# Patient Record
Sex: FEMALE | Race: WHITE | NOT HISPANIC OR LATINO | Employment: FULL TIME | URBAN - METROPOLITAN AREA
[De-identification: names, ages, dates, MRNs, and addresses within clinical notes are randomized per-mention and may not be internally consistent; named-entity substitution may affect disease eponyms.]

---

## 2022-04-27 ENCOUNTER — OFFICE VISIT (OUTPATIENT)
Dept: FAMILY MEDICINE CLINIC | Facility: CLINIC | Age: 47
End: 2022-04-27

## 2022-04-27 VITALS
TEMPERATURE: 97.7 F | HEART RATE: 93 BPM | WEIGHT: 173 LBS | HEIGHT: 68 IN | OXYGEN SATURATION: 98 % | SYSTOLIC BLOOD PRESSURE: 98 MMHG | BODY MASS INDEX: 26.22 KG/M2 | DIASTOLIC BLOOD PRESSURE: 72 MMHG

## 2022-04-27 DIAGNOSIS — R10.84 GENERALIZED ABDOMINAL PAIN: ICD-10-CM

## 2022-04-27 DIAGNOSIS — Z00.00 ANNUAL PHYSICAL EXAM: Primary | ICD-10-CM

## 2022-04-27 PROCEDURE — 99386 PREV VISIT NEW AGE 40-64: CPT | Performed by: NURSE PRACTITIONER

## 2022-04-27 PROCEDURE — 99203 OFFICE O/P NEW LOW 30 MIN: CPT | Performed by: NURSE PRACTITIONER

## 2022-04-27 PROCEDURE — 74018 RADEX ABDOMEN 1 VIEW: CPT | Performed by: NURSE PRACTITIONER

## 2022-04-27 RX ORDER — PROMETHAZINE HYDROCHLORIDE 25 MG/1
25 TABLET ORAL EVERY 6 HOURS PRN
Qty: 12 TABLET | Refills: 0 | Status: SHIPPED | OUTPATIENT
Start: 2022-04-27 | End: 2022-07-26

## 2022-04-27 NOTE — PROGRESS NOTES
"Chief Complaint  Vomiting and Diarrhea    Subjective          Ewa Pinto presents to [facility]  Patient presents to the office for an annual physical exam visit. Patient reports she just got home after a women's retreat in [place]  When she got home on Monday she was doing ok and then started to develop stomach complaints accompanied by N/V/D. Patient reported she was having abdomen cramp, burning in her stomach, and back cramps. She has had indiegestion. She tried TUMS without relief. She is not tolerating food and has had water and sprite. She pt has had N/V/D. Patient denies dysuria, frequency. Patient blood pressure is 98/72  .  Patient denies chest pain / shortness of air. Patient is without acute pain or distress at time of dictation.         Objective   Vital Signs:   BP 98/72 (BP Location: Left arm, Patient Position: Sitting, Cuff Size: Large Adult)   Pulse 93   Temp 97.7 °F (36.5 °C) (Infrared)   Ht 172.7 cm (68\")   Wt 78.5 kg (173 lb)   SpO2 98%   BMI 26.30 kg/m²     Physical Exam  Constitutional:       General: She is not in acute distress.     Appearance: Normal appearance. She is not ill-appearing, toxic-appearing or diaphoretic.   HENT:      Head: Normocephalic.      Right Ear: Tympanic membrane and external ear normal. There is no impacted cerumen.      Left Ear: Tympanic membrane and external ear normal. There is no impacted cerumen.      Nose: Nose normal. No congestion or rhinorrhea.      Mouth/Throat:      Mouth: Mucous membranes are moist.      Pharynx: Oropharynx is clear. No oropharyngeal exudate or posterior oropharyngeal erythema.   Eyes:      General:         Right eye: No discharge.         Left eye: No discharge.      Extraocular Movements: Extraocular movements intact.      Conjunctiva/sclera: Conjunctivae normal.      Pupils: Pupils are equal, round, and reactive to light.   Neck:      Vascular: No carotid bruit.   Cardiovascular:      Rate and Rhythm: " Normal rate and regular rhythm.      Pulses: Normal pulses.      Heart sounds: Normal heart sounds. No murmur heard.    No friction rub. No gallop.   Pulmonary:      Effort: Pulmonary effort is normal. No respiratory distress.      Breath sounds: Normal breath sounds. No wheezing, rhonchi or rales.   Chest:      Chest wall: No tenderness.   Abdominal:      General: Abdomen is flat. Bowel sounds are normal. There is no distension.      Palpations: Abdomen is soft. There is no mass.      Tenderness: There is generalized abdominal tenderness and tenderness in the epigastric area. There is no guarding or rebound.      Hernia: No hernia is present.   Musculoskeletal:         General: No swelling or tenderness. Normal range of motion.      Cervical back: Normal range of motion and neck supple. No tenderness.   Skin:     General: Skin is warm and dry.      Coloration: Skin is not jaundiced or pale.      Findings: No erythema or rash.   Neurological:      Mental Status: She is alert and oriented to person, place, and time.      Sensory: No sensory deficit.      Motor: No weakness.      Gait: Gait normal.   Psychiatric:         Mood and Affect: Mood normal.         Behavior: Behavior normal.         Thought Content: Thought content normal.         Judgment: Judgment normal.        Result Review :                 Assessment and Plan    Diagnoses and all orders for this visit:    1. Annual physical exam (Primary)  Assessment & Plan:  Counseling was provided on nutrition, physical activity, development, and injury prevention, dental health, and safe sex practices patient verbalizes understanding no additional questions were asked.        2. Generalized abdominal pain  Assessment & Plan:  No clinical signs of appendicitis/pancreatitis, No guarding, no rigidity.  Report to ER if signs/ symptoms worsen.   Check KUB  No obvious mass/obstruction   Radiology to read final report    BRAT diet       Orders:  -     XR Abdomen KUB (In  Office)    Other orders  -     promethazine (PHENERGAN) 25 MG tablet; Take 1 tablet by mouth Every 6 (Six) Hours As Needed for Nausea or Vomiting.  Dispense: 12 tablet; Refill: 0      Pt to rto for fasting labs.        Follow Up   No follow-ups on file.  Patient was given instructions and counseling regarding her condition or for health maintenance advice. Please see specific information pulled into the AVS if appropriate.

## 2022-04-28 DIAGNOSIS — R53.83 FATIGUE, UNSPECIFIED TYPE: ICD-10-CM

## 2022-04-28 DIAGNOSIS — Z82.49 FAMILY HISTORY OF EARLY CAD: ICD-10-CM

## 2022-04-28 DIAGNOSIS — J30.2 SEASONAL ALLERGIES: Primary | ICD-10-CM

## 2022-04-28 PROBLEM — Z00.00 ANNUAL PHYSICAL EXAM: Status: ACTIVE | Noted: 2022-04-28

## 2022-04-28 PROBLEM — R10.84 GENERALIZED ABDOMINAL PAIN: Status: ACTIVE | Noted: 2022-04-28

## 2022-04-29 NOTE — ASSESSMENT & PLAN NOTE
No clinical signs of appendicitis/pancreatitis, No guarding, no rigidity.  Report to ER if signs/ symptoms worsen.   Check KUB  No obvious mass/obstruction   Radiology to read final report    BRAT diet

## 2022-07-26 ENCOUNTER — OFFICE VISIT (OUTPATIENT)
Dept: FAMILY MEDICINE CLINIC | Facility: CLINIC | Age: 47
End: 2022-07-26

## 2022-07-26 VITALS
WEIGHT: 179 LBS | DIASTOLIC BLOOD PRESSURE: 64 MMHG | SYSTOLIC BLOOD PRESSURE: 90 MMHG | HEIGHT: 68 IN | BODY MASS INDEX: 27.13 KG/M2 | HEART RATE: 72 BPM | TEMPERATURE: 97.7 F | OXYGEN SATURATION: 98 %

## 2022-07-26 DIAGNOSIS — R53.83 FATIGUE, UNSPECIFIED TYPE: ICD-10-CM

## 2022-07-26 DIAGNOSIS — Z82.49 FAMILY HISTORY OF EARLY CAD: ICD-10-CM

## 2022-07-26 DIAGNOSIS — Z00.00 ANNUAL PHYSICAL EXAM: Primary | ICD-10-CM

## 2022-07-26 DIAGNOSIS — R05.8 PRODUCTIVE COUGH: ICD-10-CM

## 2022-07-26 DIAGNOSIS — J30.2 SEASONAL ALLERGIES: ICD-10-CM

## 2022-07-26 PROCEDURE — 84443 ASSAY THYROID STIM HORMONE: CPT | Performed by: NURSE PRACTITIONER

## 2022-07-26 PROCEDURE — 80061 LIPID PANEL: CPT | Performed by: NURSE PRACTITIONER

## 2022-07-26 PROCEDURE — 82306 VITAMIN D 25 HYDROXY: CPT | Performed by: NURSE PRACTITIONER

## 2022-07-26 PROCEDURE — 99396 PREV VISIT EST AGE 40-64: CPT | Performed by: NURSE PRACTITIONER

## 2022-07-26 PROCEDURE — 80053 COMPREHEN METABOLIC PANEL: CPT | Performed by: NURSE PRACTITIONER

## 2022-07-26 PROCEDURE — 71046 X-RAY EXAM CHEST 2 VIEWS: CPT | Performed by: NURSE PRACTITIONER

## 2022-07-26 PROCEDURE — 99213 OFFICE O/P EST LOW 20 MIN: CPT | Performed by: NURSE PRACTITIONER

## 2022-07-26 PROCEDURE — 85025 COMPLETE CBC W/AUTO DIFF WBC: CPT | Performed by: NURSE PRACTITIONER

## 2022-08-01 PROBLEM — R53.83 FATIGUE: Status: ACTIVE | Noted: 2022-08-01

## 2022-08-01 PROBLEM — Z82.49 FAMILY HISTORY OF EARLY CAD: Status: ACTIVE | Noted: 2022-08-01

## 2022-08-01 PROBLEM — R05.8 PRODUCTIVE COUGH: Status: ACTIVE | Noted: 2022-08-01

## 2022-08-01 PROBLEM — J30.2 SEASONAL ALLERGIES: Status: ACTIVE | Noted: 2022-08-01

## 2022-08-02 ENCOUNTER — TELEPHONE (OUTPATIENT)
Dept: FAMILY MEDICINE CLINIC | Facility: CLINIC | Age: 47
End: 2022-08-02

## 2022-08-02 NOTE — TELEPHONE ENCOUNTER
low vitamin D. recommend start taking a multivitamin and vitamin D replacement.  All other labs are normal chest x-ray is pending.  Pt informed of results

## 2022-08-02 NOTE — TELEPHONE ENCOUNTER
Caller: Ewa Pinto    Relationship: Self    Best call back number:[phone]    What is the best time to reach you:ANY     Who are you requesting to speak with (clinical staff, provider,  specific staff member): CLINICAL STAFF     What was the call regarding: WOULD LIKE A CALL BACK TO DISCUSS LAB RESULTS     Do you require a callback: YES

## 2022-08-15 LAB
25(OH)D3 SERPL-MCNC: 28.3 NG/ML (ref 30–100)
ALBUMIN SERPL-MCNC: 4.2 G/DL (ref 3.5–5.2)
ALBUMIN/GLOB SERPL: 1.7 G/DL
ALP SERPL-CCNC: 66 U/L (ref 39–117)
ALT SERPL W P-5'-P-CCNC: 15 U/L (ref 1–33)
ANION GAP SERPL CALCULATED.3IONS-SCNC: 9 MMOL/L (ref 5–15)
AST SERPL-CCNC: 15 U/L (ref 1–32)
BILIRUB SERPL-MCNC: 0.2 MG/DL (ref 0–1.2)
BUN SERPL-MCNC: 19 MG/DL (ref 6–20)
BUN/CREAT SERPL: 30.2 (ref 7–25)
CALCIUM SPEC-SCNC: 9.4 MG/DL (ref 8.6–10.5)
CHLORIDE SERPL-SCNC: 102 MMOL/L (ref 98–107)
CHOLEST SERPL-MCNC: 190 MG/DL (ref 0–200)
CO2 SERPL-SCNC: 27 MMOL/L (ref 22–29)
CREAT SERPL-MCNC: 0.63 MG/DL (ref 0.57–1)
EGFRCR SERPLBLD CKD-EPI 2021: 110.3 ML/MIN/1.73
ERYTHROCYTE [DISTWIDTH] IN BLOOD BY AUTOMATED COUNT: 12.8 % (ref 12.3–15.4)
GLOBULIN UR ELPH-MCNC: 2.5 GM/DL
GLUCOSE SERPL-MCNC: 87 MG/DL (ref 65–99)
HCT VFR BLD AUTO: 43.2 % (ref 34–46.6)
HDLC SERPL-MCNC: 51 MG/DL (ref 40–60)
HGB BLD-MCNC: 14 G/DL (ref 12–15.9)
LDLC SERPL CALC-MCNC: 117 MG/DL (ref 0–100)
LDLC/HDLC SERPL: 2.24 {RATIO}
LYMPHOCYTES # BLD AUTO: 2.8 10*3/MM3 (ref 0.7–3.1)
LYMPHOCYTES NFR BLD AUTO: 30.6 % (ref 19.6–45.3)
MCH RBC QN AUTO: 30.1 PG (ref 26.6–33)
MCHC RBC AUTO-ENTMCNC: 32.4 G/DL (ref 31.5–35.7)
MCV RBC AUTO: 92.7 FL (ref 79–97)
MONOCYTES # BLD AUTO: 0.5 10*3/MM3 (ref 0.1–0.9)
MONOCYTES NFR BLD AUTO: 5.3 % (ref 5–12)
NEUTROPHILS NFR BLD AUTO: 5.9 10*3/MM3 (ref 1.7–7)
NEUTROPHILS NFR BLD AUTO: 64.1 % (ref 42.7–76)
PLATELET # BLD AUTO: 245 10*3/MM3 (ref 140–450)
PMV BLD AUTO: 6.8 FL (ref 6–12)
POTASSIUM SERPL-SCNC: 4.5 MMOL/L (ref 3.5–5.2)
PROT SERPL-MCNC: 6.7 G/DL (ref 6–8.5)
RBC # BLD AUTO: 4.66 10*6/MM3 (ref 3.77–5.28)
SODIUM SERPL-SCNC: 138 MMOL/L (ref 136–145)
TRIGL SERPL-MCNC: 125 MG/DL (ref 0–150)
TSH SERPL DL<=0.05 MIU/L-ACNC: 1.16 UIU/ML (ref 0.27–4.2)
VLDLC SERPL-MCNC: 22 MG/DL (ref 5–40)
WBC NRBC COR # BLD: 9.2 10*3/MM3 (ref 3.4–10.8)

## 2023-04-26 ENCOUNTER — TELEPHONE (OUTPATIENT)
Dept: FAMILY MEDICINE CLINIC | Facility: CLINIC | Age: 48
End: 2023-04-26
Payer: COMMERCIAL

## 2023-04-26 DIAGNOSIS — Z12.31 SCREENING MAMMOGRAM FOR BREAST CANCER: Primary | ICD-10-CM

## 2023-04-26 NOTE — TELEPHONE ENCOUNTER
Caller: Ewa Pinto    Relationship: Self    Best call back number: [phone]    What is the best time to reach you: ANY    Who are you requesting to speak with (clinical staff, provider,  specific staff member): NONE    Do you know the name of the person who called: NONE    What was the call regarding: REFERRAL FOR A MAMMOGRAM    Do you require a callback: YES

## 2023-05-09 ENCOUNTER — HOSPITAL ENCOUNTER (OUTPATIENT)
Dept: MAMMOGRAPHY | Facility: HOSPITAL | Age: 48
Discharge: HOME | End: 2023-05-09
Admitting: NURSE PRACTITIONER
Payer: COMMERCIAL

## 2023-05-09 DIAGNOSIS — Z12.31 SCREENING MAMMOGRAM FOR BREAST CANCER: ICD-10-CM

## 2023-05-09 PROCEDURE — 77063 BREAST TOMOSYNTHESIS BI: CPT

## 2023-05-09 PROCEDURE — 77067 SCR MAMMO BI INCL CAD: CPT

## 2023-08-01 ENCOUNTER — OFFICE VISIT (OUTPATIENT)
Dept: FAMILY MEDICINE CLINIC | Facility: CLINIC | Age: 48
End: 2023-08-01
Payer: COMMERCIAL

## 2023-08-01 VITALS
TEMPERATURE: 97.8 F | SYSTOLIC BLOOD PRESSURE: 102 MMHG | OXYGEN SATURATION: 98 % | BODY MASS INDEX: 28.58 KG/M2 | WEIGHT: 188.6 LBS | HEART RATE: 72 BPM | HEIGHT: 68 IN | DIASTOLIC BLOOD PRESSURE: 74 MMHG

## 2023-08-01 DIAGNOSIS — J30.2 SEASONAL ALLERGIES: ICD-10-CM

## 2023-08-01 DIAGNOSIS — Z11.59 ENCOUNTER FOR HEPATITIS C SCREENING TEST FOR LOW RISK PATIENT: ICD-10-CM

## 2023-08-01 DIAGNOSIS — Z12.11 SCREEN FOR COLON CANCER: ICD-10-CM

## 2023-08-01 DIAGNOSIS — Z12.4 CERVICAL CANCER SCREENING: ICD-10-CM

## 2023-08-01 DIAGNOSIS — Z00.00 ANNUAL PHYSICAL EXAM: Primary | ICD-10-CM

## 2023-08-01 DIAGNOSIS — N95.1 MENOPAUSAL STATE: ICD-10-CM

## 2023-08-01 PROCEDURE — 99396 PREV VISIT EST AGE 40-64: CPT | Performed by: NURSE PRACTITIONER

## 2023-08-02 LAB
ALBUMIN SERPL-MCNC: 4.2 G/DL (ref 3.5–5.2)
ALBUMIN/GLOB SERPL: 1.7 G/DL
ALP SERPL-CCNC: 67 U/L (ref 39–117)
ALT SERPL-CCNC: 16 U/L (ref 1–33)
AST SERPL-CCNC: 12 U/L (ref 1–32)
BASOPHILS # BLD AUTO: 0.06 10*3/MM3 (ref 0–0.2)
BASOPHILS NFR BLD AUTO: 1.2 % (ref 0–1.5)
BILIRUB SERPL-MCNC: 0.3 MG/DL (ref 0–1.2)
BUN SERPL-MCNC: 15 MG/DL (ref 6–20)
BUN/CREAT SERPL: 23.4 (ref 7–25)
CALCIUM SERPL-MCNC: 9.8 MG/DL (ref 8.6–10.5)
CHLORIDE SERPL-SCNC: 104 MMOL/L (ref 98–107)
CHOLEST SERPL-MCNC: 184 MG/DL (ref 0–200)
CO2 SERPL-SCNC: 25.1 MMOL/L (ref 22–29)
CREAT SERPL-MCNC: 0.64 MG/DL (ref 0.57–1)
EGFRCR SERPLBLD CKD-EPI 2021: 109.2 ML/MIN/1.73
EOSINOPHIL # BLD AUTO: 0.22 10*3/MM3 (ref 0–0.4)
EOSINOPHIL NFR BLD AUTO: 4.4 % (ref 0.3–6.2)
ERYTHROCYTE [DISTWIDTH] IN BLOOD BY AUTOMATED COUNT: 12.6 % (ref 12.3–15.4)
ESTROGEN SERPL-MCNC: 70 PG/ML
FSH SERPL-ACNC: 135 MIU/ML
GLOBULIN SER CALC-MCNC: 2.5 GM/DL
GLUCOSE SERPL-MCNC: 90 MG/DL (ref 65–99)
HCT VFR BLD AUTO: 42.1 % (ref 34–46.6)
HCV IGG SERPL QL IA: NON REACTIVE
HDLC SERPL-MCNC: 61 MG/DL (ref 40–60)
HGB BLD-MCNC: 14 G/DL (ref 12–15.9)
IMM GRANULOCYTES # BLD AUTO: 0.01 10*3/MM3 (ref 0–0.05)
IMM GRANULOCYTES NFR BLD AUTO: 0.2 % (ref 0–0.5)
LDLC SERPL CALC-MCNC: 113 MG/DL (ref 0–100)
LH SERPL-ACNC: 68.3 MIU/ML
LYMPHOCYTES # BLD AUTO: 1.8 10*3/MM3 (ref 0.7–3.1)
LYMPHOCYTES NFR BLD AUTO: 36.3 % (ref 19.6–45.3)
MCH RBC QN AUTO: 30.7 PG (ref 26.6–33)
MCHC RBC AUTO-ENTMCNC: 33.3 G/DL (ref 31.5–35.7)
MCV RBC AUTO: 92.3 FL (ref 79–97)
MONOCYTES # BLD AUTO: 0.42 10*3/MM3 (ref 0.1–0.9)
MONOCYTES NFR BLD AUTO: 8.5 % (ref 5–12)
NEUTROPHILS # BLD AUTO: 2.45 10*3/MM3 (ref 1.7–7)
NEUTROPHILS NFR BLD AUTO: 49.4 % (ref 42.7–76)
NRBC BLD AUTO-RTO: 0 /100 WBC (ref 0–0.2)
PLATELET # BLD AUTO: 254 10*3/MM3 (ref 140–450)
POTASSIUM SERPL-SCNC: 4.6 MMOL/L (ref 3.5–5.2)
PROGEST SERPL-MCNC: 0.1 NG/ML
PROT SERPL-MCNC: 6.7 G/DL (ref 6–8.5)
RBC # BLD AUTO: 4.56 10*6/MM3 (ref 3.77–5.28)
SODIUM SERPL-SCNC: 141 MMOL/L (ref 136–145)
TRIGL SERPL-MCNC: 54 MG/DL (ref 0–150)
TSH SERPL DL<=0.005 MIU/L-ACNC: 0.77 UIU/ML (ref 0.27–4.2)
VLDLC SERPL CALC-MCNC: 10 MG/DL (ref 5–40)
WBC # BLD AUTO: 4.96 10*3/MM3 (ref 3.4–10.8)

## 2023-08-07 ENCOUNTER — TELEPHONE (OUTPATIENT)
Dept: FAMILY MEDICINE CLINIC | Facility: CLINIC | Age: 48
End: 2023-08-07
Payer: COMMERCIAL

## 2024-04-24 ENCOUNTER — TRANSCRIBE ORDERS (OUTPATIENT)
Dept: ADMINISTRATIVE | Facility: HOSPITAL | Age: 49
End: 2024-04-24
Payer: COMMERCIAL

## 2024-04-24 DIAGNOSIS — Z12.31 SCREENING MAMMOGRAM FOR BREAST CANCER: Primary | ICD-10-CM

## 2024-05-10 ENCOUNTER — HOSPITAL ENCOUNTER (OUTPATIENT)
Dept: MAMMOGRAPHY | Facility: HOSPITAL | Age: 49
Discharge: HOME | End: 2024-05-10
Admitting: NURSE PRACTITIONER
Payer: COMMERCIAL

## 2024-05-10 DIAGNOSIS — Z12.31 SCREENING MAMMOGRAM FOR BREAST CANCER: ICD-10-CM

## 2024-05-10 PROCEDURE — 77067 SCR MAMMO BI INCL CAD: CPT

## 2024-05-10 PROCEDURE — 77063 BREAST TOMOSYNTHESIS BI: CPT

## 2024-05-21 DIAGNOSIS — Z80.3 FAMILY HISTORY OF BREAST CANCER IN FIRST DEGREE RELATIVE: Primary | ICD-10-CM

## 2024-08-07 ENCOUNTER — OFFICE VISIT (OUTPATIENT)
Dept: MAMMOGRAPHY | Facility: CLINIC | Age: 49
End: 2024-08-07
Payer: COMMERCIAL

## 2024-08-07 VITALS
HEIGHT: 68 IN | BODY MASS INDEX: 26.07 KG/M2 | OXYGEN SATURATION: 97 % | SYSTOLIC BLOOD PRESSURE: 110 MMHG | HEART RATE: 94 BPM | DIASTOLIC BLOOD PRESSURE: 84 MMHG | WEIGHT: 172 LBS

## 2024-08-07 DIAGNOSIS — Z91.89 AT HIGH RISK FOR BREAST CANCER: Primary | ICD-10-CM

## 2024-08-07 PROCEDURE — 99204 OFFICE O/P NEW MOD 45 MIN: CPT | Performed by: SURGERY

## 2024-08-07 NOTE — LETTER
August 7, 2024     EBONIE Castro  [address] [facility] [address]  [place] [address]    Patient: Ewa Pinto   YOB: 1975   Date of Visit: 8/7/2024     Dear EBONIE Castro:       Thank you for referring Ewa Pinto to me for evaluation. Below are the relevant portions of my assessment and plan of care.    If you have questions, please do not hesitate to call me. I look forward to following Ewa along with you.         Sincerely,        Roland Damon MD        CC: No Recipients    Roland Damon MD  08/07/24 1519  Sign when Signing Visit  Chief Complaint: Ewa Pinto is a 49 y.o.. female here today for Consult        History of Present Illness:  Patient presents with management of breast cancer risk.   She is a nice 49-year-old white female noted to be at high risk for developing breast cancer.  Her sister developed breast cancer at the age of 48 which was triple negative.  She also has a maternal aunt with a triple negative breast cancer at 52 and a maternal grandmother with breast cancer at 48.  There is a possibility that her sister did test positive for a genetic mutation but we just do not have that information yet.  The patient will track that down for us.    The patient's imaging is in good order.  She had mammograms and May 2024.  I have personally reviewed those imaging studies along with the reports.  She does have implants in place on both sides which appear to be intact.  The breast tissue is heterogeneously dense.  There were no suspicious calcifications, masses, or areas of architectural distortion.    The patient also describes a 2-month history of sharp bilateral breast pain.  It seems to be again more in the axillary regions and goes down into the lateral part of the breast and she also has associated nipple burning on both sides.    Review of Systems:  Review of Systems   Skin:         The patient denies any noticeable changes to the skin of the breast.   All  other systems reviewed and are negative.     I have reviewed the ROS as documented by the MA/LPN/RN Roland Damon MD      Past Medical and Surgical History:  Breast Biopsy History:  Patient has not had a breast biopsy in the past.  Breast Cancer HIstory:  Patient does not have a past medical history of breast cancer.  Breast Operations, and year:  0  Social History     Tobacco Use   Smoking Status Former   • Current packs/day: 0.50   • Types: Cigarettes   Smokeless Tobacco Never     Obstetric History:  Patient is postmenopausal, entered menopause naturally at age: 45   Number of pregnancies:6  Number of live births: 5  Number of abortions or miscarriages: 1  Age of delivery of first child: 16  Patient breast fed, for the following lenth of time: 4 years  Length of time taking birth control pills:unknown  Patient has never taken hormone replacement    Past Surgical History:   Procedure Laterality Date   • AUGMENTATION MAMMAPLASTY     • BREAST AUGMENTATION     • LAPAROSCOPIC TUBAL LIGATION         History reviewed. No pertinent past medical history.    Prior Hospitalizations, other than for surgery or childbirth, and year:  None    Social History:  Patient is .  Patient has 3 daughters. And 2 Sons    Family History:  Family History   Problem Relation Age of Onset   • Breast cancer Sister 48        triple negative   • Breast cancer Maternal Grandmother 48   • Breast cancer Maternal Aunt 52       Vital Signs:  Vitals:    08/07/24 1321   BP: 110/84   Pulse: 94   SpO2: 97%       Medications:    Current Outpatient Prescriptions:   No current outpatient medications on file.    Physical Examination:  General Appearance:   Patient is in no distress.  She is well kept and has a BMI of 26.2.  Psychiatric:  Patient with appropriate mood and affect. Alert and oriented to self, time, and place.    Breast, RIGHT:  large sized, symmetric with the contralateral side.  Breast skin is without erythema, edema, rashes.   There are no visible abnormalities upon inspection during the arm-raising maneuver or with hands on hips in the sitting position. There is no nipple retraction, discharge or nipple/areolar skin changes.There are no masses palpable in the sitting or supine positions.  The implant appears to be intact.    Breast, LEFT:  large sized, symmetric with the contralateral side.  Breast skin is without erythema, edema, rashes.  There are no visible abnormalities upon inspection during the arm-raising maneuver or with hands on hips in the sitting position. There is no nipple retraction, discharge or nipple/areolar skin changes.There are no masses palpable in the sitting or supine positions.  The implant appears to be intact.  I can feel some creases in the capsule of the implant in the lower outer quadrant of the breast which is a little different than the other side.    Lymphatic:  There is no axillary, cervical, infraclavicular, or supraclavicular adenopathy bilaterally.    Gastrointestinal:  Abdomen is soft, nondistended, and nontender.  There was no obvious hepatosplenomegaly or abdominal mass.  There are no scars from previous surgery.    Musculoskeletal:  Good strength in all 4 extremities.   There is good range of motion in both shoulders.    Skin:  She does have multiple tattoos on her body.    Assessment:  1. At high risk for breast cancer    The patient is aware that high risk breast cancer patients are typically identified because of a strong family history for breast cancer, a genetic mutation, LCIS, atypical hyperplasia, or history of radiation to chest wall under the age of 30.  It sounds as if her sister may have a genetic mutation but the patient is unsure and her sister does live in [place].  We attempted to call her while the patient was in the office but she will keep tracking her down and let me know about the possibility of that mutation.  If there is indeed a positive mutation the patient will  definitely need to be genetically tested.  She plans to call me with those results.  Our risk assessment models of estimate her lifetime risk around 23%.  Her 5-year risk is estimated at 1.9%.  Based on these findings the patient could consider adding supplemental MRI imaging to her yearly 3D mammography.  She would like to pursue that.  It would also be helpful for her to have a clinical breast examination every 6 months and I would like to alternate those visits with Dr. Rizo    2.  Bilateral breast pain-We discussed that the differential diagnosis of breast pain includes, but is not limited to: fibrocystic condition, macrocysts, fibroadenomas, breast cancer,  trauma to the breast or chest wall, inflammation or  other musculoskeletal disturbances of the chest wall.  There are no concerning findings on her examination today or on her most recent imaging for a focal cause of her pain- ie a mass, inflammation, or trauma. Both her exam and imaging are in good order. The most likely etiology of her breast pain is fibrocystic condition, meaning a hormonal responsiveness of the breast tissue.  We discussed that this pain can be aggravated by many things, including stress, caffeine, and fluctuations in hormone levels.  We discussed the most common interventions to alleviate her symptoms, including wearing a supportive bra, reducing caffeine intake, oral vitamin E 400 units qday or BID, or evening primrose oil 2000-3000mg orally daily.  She may consider taking some vitamin E.  The patient will also be getting an MRI in the very near future which should help us rule out anything concerning.        Plan:  1.  Orders were placed for an MRI to be performed in October 2024.  I will call her with those results.  2.  I would like to see her back in the office in 1 year provide her imaging looks okay and she is not feeling anything.  3.  The patient will consider improving her breast support and perhaps taking some vitamin  E.      CPT coding:    Next Appointment:  No follow-ups on file.            EMR Dragon/transcription disclaimer:    Much of this encounter note is an electronic transcription/translocation of spoken language to printed text.  The electronic translation of spoken language may permit erroneous, or at times, nonsensical words or phrases to be inadvertently transcribed.  Although I have reviewed the note from such areas, some may still exist.

## 2024-08-07 NOTE — PROGRESS NOTES
Chief Complaint: Ewa Pinto is a 49 y.o.. female here today for Consult        History of Present Illness:  Patient presents with management of breast cancer risk.   She is a nice 49-year-old white female noted to be at high risk for developing breast cancer.  Her sister developed breast cancer at the age of 48 which was triple negative.  She also has a maternal aunt with a triple negative breast cancer at 52 and a maternal grandmother with breast cancer at 48.  There is a possibility that her sister did test positive for a genetic mutation but we just do not have that information yet.  The patient will track that down for us.    The patient's imaging is in good order.  She had mammograms and May 2024.  I have personally reviewed those imaging studies along with the reports.  She does have implants in place on both sides which appear to be intact.  The breast tissue is heterogeneously dense.  There were no suspicious calcifications, masses, or areas of architectural distortion.    The patient also describes a 2-month history of sharp bilateral breast pain.  It seems to be again more in the axillary regions and goes down into the lateral part of the breast and she also has associated nipple burning on both sides.    Review of Systems:  Review of Systems   Skin:         The patient denies any noticeable changes to the skin of the breast.   All other systems reviewed and are negative.     I have reviewed the ROS as documented by the MA/LPN/RN Roland Damon MD      Past Medical and Surgical History:  Breast Biopsy History:  Patient has not had a breast biopsy in the past.  Breast Cancer HIstory:  Patient does not have a past medical history of breast cancer.  Breast Operations, and year:  0  Social History     Tobacco Use   Smoking Status Former    Current packs/day: 0.50    Types: Cigarettes   Smokeless Tobacco Never     Obstetric History:  Patient is postmenopausal, entered menopause naturally at age: 45   Number  of pregnancies:6  Number of live births: 5  Number of abortions or miscarriages: 1  Age of delivery of first child: 16  Patient breast fed, for the following lenth of time: 4 years  Length of time taking birth control pills:unknown  Patient has never taken hormone replacement    Past Surgical History:   Procedure Laterality Date    AUGMENTATION MAMMAPLASTY      BREAST AUGMENTATION      LAPAROSCOPIC TUBAL LIGATION         History reviewed. No pertinent past medical history.    Prior Hospitalizations, other than for surgery or childbirth, and year:  None    Social History:  Patient is .  Patient has 3 daughters. And 2 Sons    Family History:  Family History   Problem Relation Age of Onset    Breast cancer Sister 48        triple negative    Breast cancer Maternal Grandmother 48    Breast cancer Maternal Aunt 52       Vital Signs:  Vitals:    08/07/24 1321   BP: 110/84   Pulse: 94   SpO2: 97%       Medications:    Current Outpatient Prescriptions:   No current outpatient medications on file.    Physical Examination:  General Appearance:   Patient is in no distress.  She is well kept and has a BMI of 26.2.  Psychiatric:  Patient with appropriate mood and affect. Alert and oriented to self, time, and place.    Breast, RIGHT:  large sized, symmetric with the contralateral side.  Breast skin is without erythema, edema, rashes.  There are no visible abnormalities upon inspection during the arm-raising maneuver or with hands on hips in the sitting position. There is no nipple retraction, discharge or nipple/areolar skin changes.There are no masses palpable in the sitting or supine positions.  The implant appears to be intact.    Breast, LEFT:  large sized, symmetric with the contralateral side.  Breast skin is without erythema, edema, rashes.  There are no visible abnormalities upon inspection during the arm-raising maneuver or with hands on hips in the sitting position. There is no nipple retraction, discharge or  nipple/areolar skin changes.There are no masses palpable in the sitting or supine positions.  The implant appears to be intact.  I can feel some creases in the capsule of the implant in the lower outer quadrant of the breast which is a little different than the other side.    Lymphatic:  There is no axillary, cervical, infraclavicular, or supraclavicular adenopathy bilaterally.    Gastrointestinal:  Abdomen is soft, nondistended, and nontender.  There was no obvious hepatosplenomegaly or abdominal mass.  There are no scars from previous surgery.    Musculoskeletal:  Good strength in all 4 extremities.   There is good range of motion in both shoulders.    Skin:  She does have multiple tattoos on her body.    Assessment:  1. At high risk for breast cancer    The patient is aware that high risk breast cancer patients are typically identified because of a strong family history for breast cancer, a genetic mutation, LCIS, atypical hyperplasia, or history of radiation to chest wall under the age of 30.  It sounds as if her sister may have a genetic mutation but the patient is unsure and her sister does live in [place].  We attempted to call her while the patient was in the office but she will keep tracking her down and let me know about the possibility of that mutation.  If there is indeed a positive mutation the patient will definitely need to be genetically tested.  She plans to call me with those results.  Our risk assessment models of estimate her lifetime risk around 23%.  Her 5-year risk is estimated at 1.9%.  Based on these findings the patient could consider adding supplemental MRI imaging to her yearly 3D mammography.  She would like to pursue that.  It would also be helpful for her to have a clinical breast examination every 6 months and I would like to alternate those visits with Dr. Rizo    2.  Bilateral breast pain-We discussed that the differential diagnosis of breast pain includes, but is not  limited to: fibrocystic condition, macrocysts, fibroadenomas, breast cancer,  trauma to the breast or chest wall, inflammation or  other musculoskeletal disturbances of the chest wall.  There are no concerning findings on her examination today or on her most recent imaging for a focal cause of her pain- ie a mass, inflammation, or trauma. Both her exam and imaging are in good order. The most likely etiology of her breast pain is fibrocystic condition, meaning a hormonal responsiveness of the breast tissue.  We discussed that this pain can be aggravated by many things, including stress, caffeine, and fluctuations in hormone levels.  We discussed the most common interventions to alleviate her symptoms, including wearing a supportive bra, reducing caffeine intake, oral vitamin E 400 units qday or BID, or evening primrose oil 2000-3000mg orally daily.  She may consider taking some vitamin E.  The patient will also be getting an MRI in the very near future which should help us rule out anything concerning.        Plan:  1.  Orders were placed for an MRI to be performed in October 2024.  I will call her with those results.  2.  I would like to see her back in the office in 1 year provide her imaging looks okay and she is not feeling anything.  3.  The patient will consider improving her breast support and perhaps taking some vitamin E.      CPT coding:    Next Appointment:  No follow-ups on file.            EMR Dragon/transcription disclaimer:    Much of this encounter note is an electronic transcription/translocation of spoken language to printed text.  The electronic translation of spoken language may permit erroneous, or at times, nonsensical words or phrases to be inadvertently transcribed.  Although I have reviewed the note from such areas, some may still exist.

## 2024-08-09 ENCOUNTER — PATIENT ROUNDING (BHMG ONLY) (OUTPATIENT)
Dept: MAMMOGRAPHY | Facility: CLINIC | Age: 49
End: 2024-08-09
Payer: COMMERCIAL

## 2024-08-09 NOTE — PROGRESS NOTES
August 9, 2024    Hello, may I speak with Ewa Pinto?    My name is Adrian      I am  with [facility]  [address] [address] [phone].    Before we get started may I verify your date of birth? 1975    I am calling to officially welcome you to our practice and ask about your recent visit. Is this a good time to talk? No, pt did not answer. MB    Tell me about your visit with us. What things went well?         We're always looking for ways to make our patients' experiences even better. Do you have recommendations on ways we may improve?      Overall were you satisfied with your first visit to our practice?        I appreciate you taking the time to speak with me today. Is there anything else I can do for you?       Thank you, and have a great day.

## 2024-11-05 ENCOUNTER — OFFICE VISIT (OUTPATIENT)
Dept: FAMILY MEDICINE CLINIC | Facility: CLINIC | Age: 49
End: 2024-11-05
Payer: COMMERCIAL

## 2024-11-05 ENCOUNTER — HOSPITAL ENCOUNTER (OUTPATIENT)
Facility: HOSPITAL | Age: 49
Discharge: HOME | End: 2024-11-05
Payer: COMMERCIAL

## 2024-11-05 VITALS
SYSTOLIC BLOOD PRESSURE: 96 MMHG | DIASTOLIC BLOOD PRESSURE: 72 MMHG | HEIGHT: 68 IN | TEMPERATURE: 97.8 F | BODY MASS INDEX: 28.04 KG/M2 | WEIGHT: 185 LBS | HEART RATE: 80 BPM | OXYGEN SATURATION: 99 %

## 2024-11-05 DIAGNOSIS — R00.2 PALPITATIONS: ICD-10-CM

## 2024-11-05 DIAGNOSIS — R31.9 HEMATURIA, UNSPECIFIED TYPE: ICD-10-CM

## 2024-11-05 DIAGNOSIS — R10.32 LEFT LOWER QUADRANT ABDOMINAL PAIN: ICD-10-CM

## 2024-11-05 DIAGNOSIS — Z72.0 VAPES NICOTINE CONTAINING SUBSTANCE: ICD-10-CM

## 2024-11-05 DIAGNOSIS — Z00.00 ANNUAL PHYSICAL EXAM: Primary | ICD-10-CM

## 2024-11-05 DIAGNOSIS — N94.10 DYSPAREUNIA IN FEMALE: ICD-10-CM

## 2024-11-05 DIAGNOSIS — R05.8 NONPRODUCTIVE COUGH: ICD-10-CM

## 2024-11-05 DIAGNOSIS — Z12.11 SCREEN FOR COLON CANCER: ICD-10-CM

## 2024-11-05 DIAGNOSIS — Z23 NEED FOR IMMUNIZATION AGAINST INFLUENZA: ICD-10-CM

## 2024-11-05 DIAGNOSIS — M54.50 LUMBAR PAIN: ICD-10-CM

## 2024-11-05 LAB
BILIRUB BLD-MCNC: NEGATIVE MG/DL
CLARITY, POC: CLEAR
COLOR UR: YELLOW
EXPIRATION DATE: ABNORMAL
GLUCOSE UR STRIP-MCNC: NEGATIVE MG/DL
KETONES UR QL: ABNORMAL
LEUKOCYTE EST, POC: NEGATIVE
Lab: ABNORMAL
NITRITE UR-MCNC: NEGATIVE MG/ML
PH UR: 6 [PH] (ref 5–8)
PROT UR STRIP-MCNC: NEGATIVE MG/DL
RBC # UR STRIP: ABNORMAL /UL
SP GR UR: 1.02 (ref 1–1.03)
UROBILINOGEN UR QL: ABNORMAL

## 2024-11-05 PROCEDURE — 93000 ELECTROCARDIOGRAM COMPLETE: CPT | Performed by: NURSE PRACTITIONER

## 2024-11-05 PROCEDURE — 81003 URINALYSIS AUTO W/O SCOPE: CPT | Performed by: NURSE PRACTITIONER

## 2024-11-05 PROCEDURE — 71046 X-RAY EXAM CHEST 2 VIEWS: CPT

## 2024-11-05 PROCEDURE — 99214 OFFICE O/P EST MOD 30 MIN: CPT | Performed by: NURSE PRACTITIONER

## 2024-11-05 PROCEDURE — 99396 PREV VISIT EST AGE 40-64: CPT | Performed by: NURSE PRACTITIONER

## 2024-11-05 PROCEDURE — 74018 RADEX ABDOMEN 1 VIEW: CPT

## 2024-11-05 NOTE — PROGRESS NOTES
"Chief Complaint  Abdominal Pain (Lower left abdomen 6/10 pain/Pain with intercourse/Several months-worsening, radiating to back/Leg numbness///)    Subjective        Ewa Pinto presents to [facility]  History of Present Illness  Patient presents to the  office [facility] for an annual physical exam.  Patient is reporting abdominal pain that started a few months ago.  She reports pain is in the left lower abdomen rates a 6 out of 10.  She is having pain with sexual intercourse.  That she has been experiencing pain for several months.  Back pain is in lower back radiating to leg she is experiencing numbness and tingling.  Blood pressure 96/72.  She denies chest pain shortness of air.  Patient reports she has not had a menstrual period in 4 years.  Patient reports family history of heart disease.  She has had reported intermittent episodes of palpitations.  With nonproductive cough and a vaping due for a chest x-ray.  She denies expiratory wheezing or shortness of air.  With hematuria further evaluate with urine culture.      Objective   Vital Signs:  BP 96/72 (BP Location: Left arm, Patient Position: Sitting, Cuff Size: Large Adult)   Pulse 80   Temp 97.8 °F (36.6 °C)   Ht 172.7 cm (67.99\")   Wt 83.9 kg (185 lb)   SpO2 99%   BMI 28.14 kg/m²   Estimated body mass index is 28.14 kg/m² as calculated from the following:    Height as of this encounter: 172.7 cm (67.99\").    Weight as of this encounter: 83.9 kg (185 lb).    BMI is >= 25 and <30. (Overweight) The following options were offered after discussion;: weight loss educational material (shared in after visit summary) and exercise counseling/recommendations      Physical Exam  Constitutional:       General: She is not in acute distress.     Appearance: Normal appearance.   HENT:      Head: Normocephalic.      Right Ear: Tympanic membrane, ear canal and external ear normal. There is no impacted cerumen.      Left Ear: Tympanic membrane, " ear canal and external ear normal. There is no impacted cerumen.      Nose: Nose normal.   Eyes:      Pupils: Pupils are equal, round, and reactive to light.   Cardiovascular:      Rate and Rhythm: Normal rate and regular rhythm.      Pulses: Normal pulses.      Heart sounds: Normal heart sounds.   Pulmonary:      Effort: Pulmonary effort is normal. No respiratory distress.      Breath sounds: Normal breath sounds. No wheezing.   Abdominal:      General: Abdomen is flat. Bowel sounds are normal.      Palpations: Abdomen is soft. There is no mass.      Tenderness: There is abdominal tenderness.      Hernia: No hernia is present.   Musculoskeletal:         General: Tenderness present. Normal range of motion.      Cervical back: Normal, normal range of motion and neck supple. No spasms or tenderness. Normal range of motion.      Thoracic back: Normal. No spasms or tenderness. Normal range of motion.      Lumbar back: Spasms and tenderness present. No edema or bony tenderness. Decreased range of motion. No scoliosis.   Skin:     General: Skin is warm.   Neurological:      General: No focal deficit present.      Mental Status: She is alert and oriented to person, place, and time.   Psychiatric:         Mood and Affect: Mood normal.         Behavior: Behavior normal.         Thought Content: Thought content normal.         Judgment: Judgment normal.        Result Review :  The following data was reviewed by: EBONIE Castro on 11/05/2024:  Common labs          11/5/2024    12:06   Common Labs   Glucose 85    BUN 12    Creatinine 0.77    Sodium 139    Potassium 4.7    Chloride 101    Calcium 9.5    Total Protein 6.9    Albumin 4.4    Total Bilirubin 0.4    Alkaline Phosphatase 72    AST (SGOT) 18    ALT (SGPT) 14    WBC 5.8    Hemoglobin 14.2    Hematocrit 43.8    Platelets 261    Total Cholesterol 218    Triglycerides 47    HDL Cholesterol 73    LDL Cholesterol  137           ECG 12 Lead    Date/Time: 11/5/2024  11:34 AM  Performed by: Kandy Rizo APRN    Authorized by: Kandy Rizo APRN  Comparison: not compared with previous ECG   Previous ECG: no previous ECG available  Rhythm: sinus rhythm  Rate: normal  BPM: 67  Conduction: conduction normal  ST Segments: ST segments normal  T Waves: T waves normal  QRS axis: normal    Clinical impression: normal ECG            Assessment and Plan   Diagnoses and all orders for this visit:    1. Annual physical exam (Primary)  -     CBC & Differential  -     Comprehensive Metabolic Panel  -     Lipid Panel  -     TSH    2. Need for immunization against influenza    3. Screen for colon cancer  -     Ambulatory Referral For Screening Colonoscopy    4. Dyspareunia in female  -     Ambulatory Referral to Gynecology  -     POCT urinalysis dipstick, automated    5. Lumbar pain  Assessment & Plan:  No falls, weakness, or new numbness or tingling. No incontinence.         6. Vapes nicotine containing substance  Assessment & Plan:  Smoking cessation discussion discussed     Orders:  -     XR Chest PA & Lateral; Future    7. Nonproductive cough  -     XR Chest PA & Lateral; Future    8. Left lower quadrant abdominal pain  Assessment & Plan:  No clinical signs of appendicitis/pancreatitis, No guarding, no rigidity.  Report to ER if signs/ symptoms worsen.       Orders:  -     XR Abdomen KUB; Future  -     Amylase  -     Lipase    9. Palpitations  Assessment & Plan:  No EKG changes of acute ischemia, no clinical evidence of acute MI, responsibility rests with patient to return for follow up for any CP/SOA, go to ER for any further signs or symptoms      Orders:  -     ECG 12 Lead    10. Hematuria, unspecified type  -     POCT urinalysis dipstick, automated  -     Urine Culture - Urine, Urine, Clean Catch      Advised patient to rest and to increase fluid intake.  Tylenol or Motrin for fever, pain or discomfort as directed.  Discussed medication instructions and side effects with  patient.  Follow-up if symptoms persist or worsen.    Counseling was provided on nutrition, physical activity, development, and injury prevention, dental health, and safe sex practices patient verbalizes understanding no additional questions were asked.         I spent 30 minutes caring for Ami on this date of service. This time includes time spent by me in the following activities:preparing for the visit, reviewing tests, obtaining and/or reviewing a separately obtained history, performing a medically appropriate examination and/or evaluation , counseling and educating the patient/family/caregiver, ordering medications, tests, or procedures, referring and communicating with other health care professionals , documenting information in the medical record, independently interpreting results and communicating that information with the patient/family/caregiver, and care coordination  Follow Up   No follow-ups on file.  Patient was given instructions and counseling regarding her condition or for health maintenance advice. Please see specific information pulled into the AVS if appropriate.

## 2024-11-06 LAB
ALBUMIN SERPL-MCNC: 4.4 G/DL (ref 3.9–4.9)
ALP SERPL-CCNC: 72 IU/L (ref 44–121)
ALT SERPL-CCNC: 14 IU/L (ref 0–32)
AMYLASE SERPL-CCNC: 77 U/L (ref 31–110)
AST SERPL-CCNC: 18 IU/L (ref 0–40)
BACTERIA UR CULT: NORMAL
BACTERIA UR CULT: NORMAL
BASOPHILS # BLD AUTO: 0.1 X10E3/UL (ref 0–0.2)
BASOPHILS NFR BLD AUTO: 1 %
BILIRUB SERPL-MCNC: 0.4 MG/DL (ref 0–1.2)
BUN SERPL-MCNC: 12 MG/DL (ref 6–24)
BUN/CREAT SERPL: 16 (ref 9–23)
CALCIUM SERPL-MCNC: 9.5 MG/DL (ref 8.7–10.2)
CHLORIDE SERPL-SCNC: 101 MMOL/L (ref 96–106)
CHOLEST SERPL-MCNC: 218 MG/DL (ref 100–199)
CO2 SERPL-SCNC: 27 MMOL/L (ref 20–29)
CREAT SERPL-MCNC: 0.77 MG/DL (ref 0.57–1)
EGFRCR SERPLBLD CKD-EPI 2021: 95 ML/MIN/1.73
EOSINOPHIL # BLD AUTO: 0.1 X10E3/UL (ref 0–0.4)
EOSINOPHIL NFR BLD AUTO: 2 %
ERYTHROCYTE [DISTWIDTH] IN BLOOD BY AUTOMATED COUNT: 12 % (ref 11.7–15.4)
GLOBULIN SER CALC-MCNC: 2.5 G/DL (ref 1.5–4.5)
GLUCOSE SERPL-MCNC: 85 MG/DL (ref 70–99)
HCT VFR BLD AUTO: 43.8 % (ref 34–46.6)
HDLC SERPL-MCNC: 73 MG/DL
HGB BLD-MCNC: 14.2 G/DL (ref 11.1–15.9)
IMM GRANULOCYTES # BLD AUTO: 0 X10E3/UL (ref 0–0.1)
IMM GRANULOCYTES NFR BLD AUTO: 0 %
LDLC SERPL CALC-MCNC: 137 MG/DL (ref 0–99)
LIPASE SERPL-CCNC: 41 U/L (ref 14–72)
LYMPHOCYTES # BLD AUTO: 2.2 X10E3/UL (ref 0.7–3.1)
LYMPHOCYTES NFR BLD AUTO: 37 %
MCH RBC QN AUTO: 29.9 PG (ref 26.6–33)
MCHC RBC AUTO-ENTMCNC: 32.4 G/DL (ref 31.5–35.7)
MCV RBC AUTO: 92 FL (ref 79–97)
MONOCYTES # BLD AUTO: 0.4 X10E3/UL (ref 0.1–0.9)
MONOCYTES NFR BLD AUTO: 7 %
NEUTROPHILS # BLD AUTO: 3.1 X10E3/UL (ref 1.4–7)
NEUTROPHILS NFR BLD AUTO: 53 %
PLATELET # BLD AUTO: 261 X10E3/UL (ref 150–450)
POTASSIUM SERPL-SCNC: 4.7 MMOL/L (ref 3.5–5.2)
PROT SERPL-MCNC: 6.9 G/DL (ref 6–8.5)
RBC # BLD AUTO: 4.75 X10E6/UL (ref 3.77–5.28)
SODIUM SERPL-SCNC: 139 MMOL/L (ref 134–144)
TRIGL SERPL-MCNC: 47 MG/DL (ref 0–149)
TSH SERPL DL<=0.005 MIU/L-ACNC: 0.72 UIU/ML (ref 0.45–4.5)
VLDLC SERPL CALC-MCNC: 8 MG/DL (ref 5–40)
WBC # BLD AUTO: 5.8 X10E3/UL (ref 3.4–10.8)

## 2024-11-07 ENCOUNTER — TELEPHONE (OUTPATIENT)
Dept: FAMILY MEDICINE CLINIC | Facility: CLINIC | Age: 49
End: 2024-11-07
Payer: COMMERCIAL

## 2024-11-07 NOTE — TELEPHONE ENCOUNTER
Caller: Ewa Pinto     Best call back number: [phone]    What is your medical concern? PATIENT STATES THAT SHE IS IN A LOT OF PAIN AND WHEN SHE SITS OR LAYS DOWN SHE IS IN A LOT MORE PAIN. SHE STATES THAT THIS IS IN HER BACK AND ABDOMINAL. PATIENT STATES THAT SHE SAW [facility] ON 11/05 AND THE UTI TEST CAME BACK NEGATIVE. PATIENT DOES NOT THINK THIS PAIN IS NORMAL. PATIENT WOULD LIKE TO KNOW WHAT HER NEXT STEPS ARE.     How long has this issue been going on? LAST FOUR TO FIVE DAYS SHE IS HAVING EXTREME PAIN AND IS NOT SUBSIDING     Is your provider already aware of this issue? YES    Have you been treated for this issue? NO    
Patient informed of labs via [employer]   
Patient informed still awaiting result review on labs and xrays.     Advised to go to the ER if no better. She understands and will go to tomorrow 11/8 if pain persists.   
none at this time

## 2024-11-08 ENCOUNTER — TELEPHONE (OUTPATIENT)
Dept: FAMILY MEDICINE CLINIC | Facility: CLINIC | Age: 49
End: 2024-11-08
Payer: COMMERCIAL

## 2024-11-08 NOTE — TELEPHONE ENCOUNTER
Patient informed. Does not want to move forward with CT as of now due to $, pain has lessened some. Will call back on Monday with final decision.

## 2024-11-08 NOTE — TELEPHONE ENCOUNTER
Caller: Ewa Pinto    Relationship: Self    Best call back number: [phone]     Who are you requesting to speak with (clinical staff, provider,  specific staff member): CLINICAL STAFF    Do you know the name of the person who called: ARNULFO    What was the call regarding: TEST RESULTS

## 2024-11-10 PROBLEM — Z23 NEED FOR IMMUNIZATION AGAINST INFLUENZA: Status: ACTIVE | Noted: 2024-11-10

## 2024-11-10 PROBLEM — Z12.11 SCREEN FOR COLON CANCER: Status: ACTIVE | Noted: 2022-04-28

## 2024-11-11 PROBLEM — R31.9 HEMATURIA: Status: ACTIVE | Noted: 2024-11-11

## 2025-02-10 ENCOUNTER — TELEPHONE (OUTPATIENT)
Dept: MAMMOGRAPHY | Facility: CLINIC | Age: 50
End: 2025-02-10
Payer: COMMERCIAL

## 2025-02-10 NOTE — TELEPHONE ENCOUNTER
Wilfrido for pt to call office and schedule appt with lan Sheehan and her Osiel of [facility].  Mri needs to be in July with her appt with carlos in Aug.

## 2025-02-17 ENCOUNTER — TELEPHONE (OUTPATIENT)
Dept: MAMMOGRAPHY | Facility: CLINIC | Age: 50
End: 2025-02-17
Payer: COMMERCIAL

## 2025-02-25 ENCOUNTER — TELEPHONE (OUTPATIENT)
Dept: MAMMOGRAPHY | Facility: CLINIC | Age: 50
End: 2025-02-25
Payer: COMMERCIAL

## 2025-02-26 ENCOUNTER — TELEPHONE (OUTPATIENT)
Dept: MAMMOGRAPHY | Facility: CLINIC | Age: 50
End: 2025-02-26
Payer: COMMERCIAL

## 2025-02-26 NOTE — TELEPHONE ENCOUNTER
Tried to contact pt with 3 calls and 1 letter to reschedule her appt with Keaton Sheehan.  No responsee from pt.  Cancelled her appt on 8-6-25